# Patient Record
Sex: FEMALE | Employment: FULL TIME | URBAN - METROPOLITAN AREA
[De-identification: names, ages, dates, MRNs, and addresses within clinical notes are randomized per-mention and may not be internally consistent; named-entity substitution may affect disease eponyms.]

---

## 2019-08-13 ENCOUNTER — CLINICAL SUPPORT (OUTPATIENT)
Dept: OTHER | Facility: CLINIC | Age: 49
End: 2019-08-13
Payer: COMMERCIAL

## 2019-08-13 DIAGNOSIS — Z00.8 ENCOUNTER FOR OTHER GENERAL EXAMINATION: ICD-10-CM

## 2019-08-13 PROCEDURE — 99401 PREV MED CNSL INDIV APPRX 15: CPT | Mod: S$GLB,,, | Performed by: INTERNAL MEDICINE

## 2019-08-13 PROCEDURE — 80061 PR  LIPID PANEL: ICD-10-PCS | Mod: QW,S$GLB,, | Performed by: INTERNAL MEDICINE

## 2019-08-13 PROCEDURE — 80061 LIPID PANEL: CPT | Mod: QW,S$GLB,, | Performed by: INTERNAL MEDICINE

## 2019-08-13 PROCEDURE — 99401 PR PREVENT COUNSEL,INDIV,15 MIN: ICD-10-PCS | Mod: S$GLB,,, | Performed by: INTERNAL MEDICINE

## 2019-08-13 PROCEDURE — 82947 PR  ASSAY QUANTITATIVE,BLOOD GLUCOSE: ICD-10-PCS | Mod: QW,S$GLB,, | Performed by: INTERNAL MEDICINE

## 2019-08-13 PROCEDURE — 82947 ASSAY GLUCOSE BLOOD QUANT: CPT | Mod: QW,S$GLB,, | Performed by: INTERNAL MEDICINE

## 2019-08-14 VITALS — HEIGHT: 60 IN

## 2019-08-14 LAB
HDLC SERPL-MCNC: 49 MG/DL
POC CHOLESTEROL, LDL (DOCK): 70 MG/DL
POC CHOLESTEROL, TOTAL: 164 MG/DL
POC GLUCOSE, FASTING: 85 MG/DL (ref 60–110)
TRIGL SERPL-MCNC: 222 MG/DL

## 2022-05-16 ENCOUNTER — HOSPITAL ENCOUNTER (EMERGENCY)
Facility: HOSPITAL | Age: 52
Discharge: ANOTHER HOSPITAL | End: 2022-05-16
Attending: EMERGENCY MEDICINE
Payer: COMMERCIAL

## 2022-05-16 VITALS
BODY MASS INDEX: 24.03 KG/M2 | TEMPERATURE: 99 F | DIASTOLIC BLOOD PRESSURE: 87 MMHG | WEIGHT: 122.38 LBS | SYSTOLIC BLOOD PRESSURE: 151 MMHG | RESPIRATION RATE: 16 BRPM | HEART RATE: 122 BPM | OXYGEN SATURATION: 100 % | HEIGHT: 60 IN

## 2022-05-16 DIAGNOSIS — I16.1 HYPERTENSIVE EMERGENCY: Primary | ICD-10-CM

## 2022-05-16 DIAGNOSIS — I63.9 ACUTE ISCHEMIC STROKE: ICD-10-CM

## 2022-05-16 DIAGNOSIS — R53.1 WEAKNESS: ICD-10-CM

## 2022-05-16 DIAGNOSIS — H54.61 VISION LOSS OF RIGHT EYE: ICD-10-CM

## 2022-05-16 LAB
ALBUMIN SERPL BCP-MCNC: 4.6 G/DL (ref 3.5–5.2)
ALP SERPL-CCNC: 83 U/L (ref 55–135)
ALT SERPL W/O P-5'-P-CCNC: 14 U/L (ref 10–44)
ANION GAP SERPL CALC-SCNC: 16 MMOL/L (ref 8–16)
APTT BLDCRRT: 24.5 SEC (ref 21–32)
AST SERPL-CCNC: 21 U/L (ref 10–40)
BASOPHILS # BLD AUTO: 0.05 K/UL (ref 0–0.2)
BASOPHILS NFR BLD: 0.8 % (ref 0–1.9)
BILIRUB SERPL-MCNC: 0.8 MG/DL (ref 0.1–1)
BUN SERPL-MCNC: 17 MG/DL (ref 6–20)
CALCIUM SERPL-MCNC: 10.1 MG/DL (ref 8.7–10.5)
CHLORIDE SERPL-SCNC: 104 MMOL/L (ref 95–110)
CO2 SERPL-SCNC: 23 MMOL/L (ref 23–29)
CREAT SERPL-MCNC: 1.3 MG/DL (ref 0.5–1.4)
DIFFERENTIAL METHOD: NORMAL
EOSINOPHIL # BLD AUTO: 0.1 K/UL (ref 0–0.5)
EOSINOPHIL NFR BLD: 1.3 % (ref 0–8)
ERYTHROCYTE [DISTWIDTH] IN BLOOD BY AUTOMATED COUNT: 12.9 % (ref 11.5–14.5)
EST. GFR  (AFRICAN AMERICAN): 56 ML/MIN/1.73 M^2
EST. GFR  (NON AFRICAN AMERICAN): 48 ML/MIN/1.73 M^2
GLUCOSE SERPL-MCNC: 108 MG/DL (ref 70–110)
HCT VFR BLD AUTO: 37.6 % (ref 37–48.5)
HGB BLD-MCNC: 12.6 G/DL (ref 12–16)
IMM GRANULOCYTES # BLD AUTO: 0.02 K/UL (ref 0–0.04)
IMM GRANULOCYTES NFR BLD AUTO: 0.3 % (ref 0–0.5)
INR PPP: 1 (ref 0.8–1.2)
LYMPHOCYTES # BLD AUTO: 2.5 K/UL (ref 1–4.8)
LYMPHOCYTES NFR BLD: 40.5 % (ref 18–48)
MCH RBC QN AUTO: 30.1 PG (ref 27–31)
MCHC RBC AUTO-ENTMCNC: 33.5 G/DL (ref 32–36)
MCV RBC AUTO: 90 FL (ref 82–98)
MONOCYTES # BLD AUTO: 0.4 K/UL (ref 0.3–1)
MONOCYTES NFR BLD: 6.2 % (ref 4–15)
NEUTROPHILS # BLD AUTO: 3.1 K/UL (ref 1.8–7.7)
NEUTROPHILS NFR BLD: 50.9 % (ref 38–73)
NRBC BLD-RTO: 0 /100 WBC
PLATELET # BLD AUTO: 298 K/UL (ref 150–450)
PMV BLD AUTO: 9.8 FL (ref 9.2–12.9)
POCT GLUCOSE: 93 MG/DL (ref 70–110)
POTASSIUM SERPL-SCNC: 4.4 MMOL/L (ref 3.5–5.1)
PROT SERPL-MCNC: 8 G/DL (ref 6–8.4)
PROTHROMBIN TIME: 10.4 SEC (ref 9–12.5)
RBC # BLD AUTO: 4.18 M/UL (ref 4–5.4)
SODIUM SERPL-SCNC: 143 MMOL/L (ref 136–145)
TROPONIN I SERPL DL<=0.01 NG/ML-MCNC: <0.006 NG/ML (ref 0–0.03)
WBC # BLD AUTO: 6.13 K/UL (ref 3.9–12.7)

## 2022-05-16 PROCEDURE — G0508 CRIT CARE TELEHEA CONSULT 60: HCPCS | Mod: GT,,, | Performed by: PSYCHIATRY & NEUROLOGY

## 2022-05-16 PROCEDURE — 99291 CRITICAL CARE FIRST HOUR: CPT | Mod: 25

## 2022-05-16 PROCEDURE — 63600175 PHARM REV CODE 636 W HCPCS: Performed by: EMERGENCY MEDICINE

## 2022-05-16 PROCEDURE — 93010 EKG 12-LEAD: ICD-10-PCS | Mod: ,,, | Performed by: INTERNAL MEDICINE

## 2022-05-16 PROCEDURE — G0508 PR CRITICAL CARE TELEHLTH INITIAL CONSULT 60MIN: ICD-10-PCS | Mod: GT,,, | Performed by: PSYCHIATRY & NEUROLOGY

## 2022-05-16 PROCEDURE — 85610 PROTHROMBIN TIME: CPT | Performed by: NURSE PRACTITIONER

## 2022-05-16 PROCEDURE — 85730 THROMBOPLASTIN TIME PARTIAL: CPT | Performed by: NURSE PRACTITIONER

## 2022-05-16 PROCEDURE — 82962 GLUCOSE BLOOD TEST: CPT

## 2022-05-16 PROCEDURE — 85025 COMPLETE CBC W/AUTO DIFF WBC: CPT | Performed by: NURSE PRACTITIONER

## 2022-05-16 PROCEDURE — 84484 ASSAY OF TROPONIN QUANT: CPT | Performed by: NURSE PRACTITIONER

## 2022-05-16 PROCEDURE — 93010 ELECTROCARDIOGRAM REPORT: CPT | Mod: ,,, | Performed by: INTERNAL MEDICINE

## 2022-05-16 PROCEDURE — 96375 TX/PRO/DX INJ NEW DRUG ADDON: CPT

## 2022-05-16 PROCEDURE — 93005 ELECTROCARDIOGRAM TRACING: CPT

## 2022-05-16 PROCEDURE — 80053 COMPREHEN METABOLIC PANEL: CPT | Performed by: NURSE PRACTITIONER

## 2022-05-16 PROCEDURE — 96374 THER/PROPH/DIAG INJ IV PUSH: CPT

## 2022-05-16 RX ORDER — DILTIAZEM HYDROCHLORIDE 5 MG/ML
INJECTION INTRAVENOUS
Status: DISCONTINUED
Start: 2022-05-16 | End: 2022-05-16 | Stop reason: WASHOUT

## 2022-05-16 RX ORDER — LORAZEPAM 2 MG/ML
1 INJECTION INTRAMUSCULAR
Status: DISCONTINUED | OUTPATIENT
Start: 2022-05-16 | End: 2022-05-16 | Stop reason: HOSPADM

## 2022-05-16 RX ORDER — HYDRALAZINE HYDROCHLORIDE 20 MG/ML
20 INJECTION INTRAMUSCULAR; INTRAVENOUS
Status: DISCONTINUED | OUTPATIENT
Start: 2022-05-16 | End: 2022-05-16

## 2022-05-16 RX ORDER — SODIUM CHLORIDE 9 MG/ML
INJECTION, SOLUTION INTRAVENOUS
Status: DISCONTINUED | OUTPATIENT
Start: 2022-05-16 | End: 2022-05-16 | Stop reason: HOSPADM

## 2022-05-16 RX ORDER — HYDRALAZINE HYDROCHLORIDE 20 MG/ML
10 INJECTION INTRAMUSCULAR; INTRAVENOUS
Status: COMPLETED | OUTPATIENT
Start: 2022-05-16 | End: 2022-05-16

## 2022-05-16 RX ADMIN — ALTEPLASE 45 MG: KIT at 04:05

## 2022-05-16 RX ADMIN — HYDRALAZINE HYDROCHLORIDE 10 MG: 20 INJECTION, SOLUTION INTRAMUSCULAR; INTRAVENOUS at 04:05

## 2022-05-16 NOTE — ASSESSMENT & PLAN NOTE
R superior quadrantanopsia, subjective diplopia on horizontal leftward gaze, significant gait imbalance.  No contraindications to alteplase at this time.    Antithrombotics for secondary stroke prevention: Antiplatelets: None: Hold all Antithrombotics x 24 hours after IV t-PA administration    Statins for secondary stroke prevention and hyperlipidemia, if present:   Statins: per profile    Aggressive risk factor modification: None     Rehab efforts: The patient has been evaluated by a stroke team provider and the therapy needs have been fully considered based off the presenting complaints and exam findings. The following therapy evaluations are needed: PT evaluate and treat, OT evaluate and treat, SLP evaluate and treat    Diagnostics ordered/pending: CTA Head to assess vasculature , CTA Neck/Arch to assess vasculature, HgbA1C to assess blood glucose levels, Lipid Profile to assess cholesterol levels, MRI head without contrast to assess brain parenchyma, TTE to assess cardiac function/status     VTE prophylaxis: None: Reason for No Pharmacological VTE Prophylaxis: Holding x 24 hours s/p treatment with alteplase (tPA)    BP parameters: Infarct: Post tPA, SBP <180

## 2022-05-16 NOTE — FIRST PROVIDER EVALUATION
Medical screening exam completed.  I have conducted a focused provider triage encounter, findings are as follows:    Brief history of present illness:  Patient presents to ER for visual changes to left eye, onset today. Patient reports leaning to right side when walking.    Vitals:    05/16/22 1513   BP: (!) 196/108   BP Location: Right arm   Patient Position: Sitting   Pulse: 104   Resp: 18   Temp: 98.9 °F (37.2 °C)   TempSrc: Oral   SpO2: 100%   Weight: 49.9 kg (110 lb)       Pertinent physical exam:  Patient AAOx3, remained in wheelchair throughout triage. Speech clear.    Brief workup plan:  imaging, labs, EKG    Preliminary workup initiated; this workup will be continued and followed by the physician or advanced practice provider that is assigned to the patient when roomed.

## 2022-05-16 NOTE — CONSULTS
[facility]  Vascular Neurology  Comprehensive Stroke Center  TeleVascular Neurology Acute Consultation Note      Consults    Consulting Provider: ANTONIO NIETO JR  Current Providers  No providers found    Patient Location:  [facility] EMERGENCY DEPARTMENT Emergency Department  Spoke hospital nurse at bedside with patient assisting consultant.     Patient information was obtained from patient and relative(s).         Assessment/Plan:       Diagnoses:   Acute ischemic stroke  R superior quadrantanopsia, subjective diplopia on horizontal leftward gaze, significant gait imbalance.  No contraindications to alteplase at this time.    Antithrombotics for secondary stroke prevention: Antiplatelets: None: Hold all Antithrombotics x 24 hours after IV t-PA administration    Statins for secondary stroke prevention and hyperlipidemia, if present:   Statins: per profile    Aggressive risk factor modification: None     Rehab efforts: The patient has been evaluated by a stroke team provider and the therapy needs have been fully considered based off the presenting complaints and exam findings. The following therapy evaluations are needed: PT evaluate and treat, OT evaluate and treat, SLP evaluate and treat    Diagnostics ordered/pending: CTA Head to assess vasculature , CTA Neck/Arch to assess vasculature, HgbA1C to assess blood glucose levels, Lipid Profile to assess cholesterol levels, MRI head without contrast to assess brain parenchyma, TTE to assess cardiac function/status     VTE prophylaxis: None: Reason for No Pharmacological VTE Prophylaxis: Holding x 24 hours s/p treatment with alteplase (tPA)    BP parameters: Infarct: Post tPA, SBP <180            STROKE DOCUMENTATION     Acute Stroke Times:   Acute Stroke Times   Last Known Normal Time: 1400  Stroke Team Called Time: 1535  Stroke Team Arrival Time: 1537  CT Interpretation Time: 1537  Alteplase Recommended: Yes  Decision to Treat Time  for Alteplase: 1554    NIH Scale:  1a. Level of Consciousness: 0-->Alert, keenly responsive  1b. LOC Questions: 0-->Answers both questions correctly  1c. LOC Commands: 0-->Performs both tasks correctly  2. Best Gaze: 0-->Normal (subjective diplopia on leftward gaze)  3. Visual: 1-->Partial hemianopia (R upper quad)  4. Facial Palsy: 0-->Normal symmetrical movements  5a. Motor Arm, Left: 0-->No drift, limb holds 90 (or 45) degrees for full 10 secs  5b. Motor Arm, Right: 0-->No drift, limb holds 90 (or 45) degrees for full 10 secs  6a. Motor Leg, Left: 0-->No drift, leg holds 30 degree position for full 5 secs  6b. Motor Leg, Right: 0-->No drift, leg holds 30 degree position for full 5 secs  7. Limb Ataxia: 0-->Absent  8. Sensory: 0-->Normal, no sensory loss  9. Best Language: 0-->No aphasia, normal  10. Dysarthria: 0-->Normal  11. Extinction and Inattention (formerly Neglect): 0-->No abnormality  Total (NIH Stroke Scale): 1     Modified Cook    Magdiel Coma Scale:    ABCD2 Score:    [id number] Score:   HAS -BLED Score:   ICH Score:   Hunt & Carias Classification:       Blood pressure (!) 196/108, pulse 104, temperature 98.9 °F (37.2 °C), temperature source Oral, resp. rate 18, weight 49.9 kg (110 lb), SpO2 100 %.  Alteplase Eligible?: Yes  Alteplase Recommendation:   Alteplase Total Dose:   Total dose: Alteplase 0.9mg/kg (max dose:90mg)                      ** based on acquired weight from facility   Bolus Dose:   10% of total Alteplase dose given intravenously over 1 minute   Continuous Infusion Dose:   Remaining 90% of total Alteplase dose infused intravenously over 60 minutes    **infusion must start at the same time as the bolus dose     Additional Recommendations:   1. Neurological assessment and vital signs (except temperature) every 15 minutes during Altaplase infusion.  2. Frequency of BP assessments may need to be increased if systolic BP stays >= 180 mm Hg or diastolic BP stays >= 105 mm Hg. Administer  antihypertensive meds as ordered  3. Continue to monitor and control blood pressure and monitor for neurological deterioration every 15 minutes for the first hour after the infusion is stopped. Then every 30 minutes for the next 6 hours. Perform hourly monitoring from the 8th post-infusion hour until 24 hours post-infusion.  4. Temperature every 4 hours or as required.  5. Follow hospital protocol for further orders re: post tPA infusion patient management.  6. No antithrombotics or anticoagulants (including but not limited to: heparin, warfarin, aspirin, clopidigrel, or dipyridamole) for 24 hours, then start antithrombotics as ordered by treating physician    Adapted from the American Heart Association/American Stroke Association (AHA/ASA) and American Association of Neuroscience Nurses (AANN) Guidelines.   Possible Interventional Revascularization Candidate? Yes    Disposition Recommendation: transfer to [facility] by  air  stat    Subjective:     History of Present Illness:  51F w/ vision difficulty and imbalance. Currently starting to see better but not back to baseline.Hx of anxiety and depression. Episode today w/ daughter who is struggling and was made very upset today by her daughter.        Woke up with symptoms?: no    Recent bleeding noted: no  Does the patient take any Blood Thinners? no  Medications: No relevant medications      Past Medical History: depression, anxiety    Past Surgical History: no major surgeries within the last 2 weeks    Family History: no relevant history    Social History: no smoking, no drinking, no drugs    Allergies: No Known Allergies No relevant allergies    Review of Systems   Constitutional: Negative for appetite change.   HENT: Negative for congestion.    Eyes: Negative for discharge.   Respiratory: Negative for shortness of breath.    Cardiovascular: Negative for chest pain.   Gastrointestinal: Negative for abdominal pain.   Endocrine: Negative for cold  intolerance.   Genitourinary: Negative for difficulty urinating.   Musculoskeletal: Negative for joint swelling.   Skin: Negative for color change.     Objective:   Vitals: Blood pressure (!) 196/108, pulse 104, temperature 98.9 °F (37.2 °C), temperature source Oral, resp. rate 18, weight 49.9 kg (110 lb), SpO2 100 %.     CT READ: Yes  No hemmorhage. No mass effect. No early infarct signs.     Physical Exam  Constitutional:       General: She is not in acute distress.  HENT:      Head: Normocephalic.      Right Ear: External ear normal.      Left Ear: External ear normal.   Eyes:      Conjunctiva/sclera: Conjunctivae normal.   Pulmonary:      Effort: Pulmonary effort is normal.      Breath sounds: No stridor.   Abdominal:      Tenderness: There is no abdominal tenderness.   Musculoskeletal:      Cervical back: Normal range of motion.   Skin:     General: Skin is dry.      Findings: No rash.               Recommended the emergency room physician to have a brief discussion with the patient and/or family if available regarding the  risks and benefits of treatment, and to briefly document the occurrence of that discussion in his clinical encounter note.     The attending portion of this evaluation, treatment, and documentation was performed per Rangel Gaston MD via audiovisual.    In your opinion, this was a: Tier 1 Van Negative    Consult End Time: 3:58 PM     Rangel Gaston MD  [facility]  Vascular Neurology   [facility]

## 2022-05-16 NOTE — HPI
51F w/ vision difficulty and imbalance. Currently starting to see better but not back to baseline.Hx of anxiety and depression. Episode today w/ daughter who is struggling and was made very upset today by her daughter.

## 2022-05-16 NOTE — SUBJECTIVE & OBJECTIVE
Woke up with symptoms?: no    Recent bleeding noted: no  Does the patient take any Blood Thinners? no  Medications: No relevant medications      Past Medical History: depression, anxiety    Past Surgical History: no major surgeries within the last 2 weeks    Family History: no relevant history    Social History: no smoking, no drinking, no drugs    Allergies: No Known Allergies No relevant allergies    Review of Systems   Constitutional: Negative for appetite change.   HENT: Negative for congestion.    Eyes: Negative for discharge.   Respiratory: Negative for shortness of breath.    Cardiovascular: Negative for chest pain.   Gastrointestinal: Negative for abdominal pain.   Endocrine: Negative for cold intolerance.   Genitourinary: Negative for difficulty urinating.   Musculoskeletal: Negative for joint swelling.   Skin: Negative for color change.     Objective:   Vitals: Blood pressure (!) 196/108, pulse 104, temperature 98.9 °F (37.2 °C), temperature source Oral, resp. rate 18, weight 49.9 kg (110 lb), SpO2 100 %.     CT READ: Yes  No hemmorhage. No mass effect. No early infarct signs.     Physical Exam  Constitutional:       General: She is not in acute distress.  HENT:      Head: Normocephalic.      Right Ear: External ear normal.      Left Ear: External ear normal.   Eyes:      Conjunctiva/sclera: Conjunctivae normal.   Pulmonary:      Effort: Pulmonary effort is normal.      Breath sounds: No stridor.   Abdominal:      Tenderness: There is no abdominal tenderness.   Musculoskeletal:      Cervical back: Normal range of motion.   Skin:     General: Skin is dry.      Findings: No rash.

## 2022-05-16 NOTE — ED PROVIDER NOTES
SCRIBE #1 NOTE: I, Jenaro Lobato, am scribing for, and in the presence of, Rosendo Mane Jr., MD. I have scribed the HPI, ROS, and PEx.     SCRIBE #2 NOTE: I, uHgo Haines, am scribing for, and in the presence of,  Hilton Saini Do, MD. I have scribed the remaining portions of the note not scribed by Scribe #1.     History      Chief Complaint   Patient presents with    Loss of Vision     Loss of vision suddenly in left eye 1 hour ago. Feels like she cant walk because shes leaning to the right        Review of patient's allergies indicates:  No Known Allergies     HPI   HPI    5/16/2022, 3:29 PM   History obtained from the patient      History of Present Illness: Sloane Perkins is a 51 y.o. female patient who presents to the Emergency Department for L-eye vision loss, onset suddenly just PTA. Last known well time 1 hour PTA. Symptoms are constant and moderate in severity. No mitigating or exacerbating factors reported. Associated sxs include dizziness, LLE weakness, L-sided tinnitus, headache, and R-leaning gait. Patient denies any fever, chills, n/v/d, SOB, CP, numbness, headache, and all other sxs at this time. No prior Tx reported. No further complaints or concerns at this time.     Arrival mode: Personal vehicle    PCP: Primary Doctor No       Past Medical History:  No past medical history on file.    Past Surgical History:  No past surgical history on file.      Family History:  No family history on file.    Social History:  Social History     Tobacco Use    Smoking status: Not on file    Smokeless tobacco: Not on file   Substance and Sexual Activity    Alcohol use: Not on file    Drug use: Not on file    Sexual activity: Not on file       ROS   Review of Systems   Constitutional: Negative for chills and fever.   HENT: Positive for tinnitus (L). Negative for sore throat.    Eyes: Positive for visual disturbance (L eye vision loss).   Respiratory: Negative for shortness of breath.    Cardiovascular:  Negative for chest pain.   Gastrointestinal: Negative for diarrhea, nausea and vomiting.   Genitourinary: Negative for dysuria.   Musculoskeletal: Positive for gait problem (R-leaning). Negative for back pain.   Skin: Negative for rash.   Neurological: Positive for dizziness, weakness (LLE) and headaches. Negative for numbness.   Hematological: Does not bruise/bleed easily.   All other systems reviewed and are negative.    Physical Exam      Initial Vitals [05/16/22 1513]   BP Pulse Resp Temp SpO2   (!) 196/108 104 18 98.9 °F (37.2 °C) 100 %      MAP       --          Physical Exam  Nursing Notes and Vital Signs Reviewed.  Constitutional: Patient is in no acute distress. Well-developed and well-nourished.  Head: Atraumatic. Normocephalic.  Eyes:  EOM intact.  No scleral icterus.  ENT: Mucous membranes are moist.  Nares clear   Neck:  Full ROM. No JVD.  Cardiovascular: Regular rate. Regular rhythm No murmurs, rubs, or gallops. Distal pulses are 2+ and symmetric  Pulmonary/Chest: No respiratory distress. Clear to auscultation bilaterally. No wheezing or rales.  Equal chest wall rise bilaterally  Abdominal: Soft and non-distended.  There is no tenderness.  No rebound, guarding, or rigidity. Good bowel sounds.  Genitourinary: No CVA tenderness.  No suprapubic tenderness  Musculoskeletal: Moves all extremities. No obvious deformities.  5 x 5 strength in all extremities   Skin: Warm and dry.  Neurological:  Alert, awake, and appropriate.  Normal speech. Two through 12 intact bilaterally.  Psychiatric: Normal affect. Good eye contact. Appropriate in content.    ED Course    Critical Care    Date/Time: 5/16/2022 3:34 PM  Performed by: Rosendo Mane Jr., MD  Authorized by: Rosendo Mane Jr., MD   Direct patient critical care time: 25 minutes  Additional history critical care time: 5 minutes  Ordering / reviewing critical care time: 15 minutes  Documentation critical care time: 10 minutes  Consulting other physicians  critical care time: 5 minutes  Total critical care time (exclusive of procedural time) : 60 minutes  Critical care time was exclusive of separately billable procedures and treating other patients and teaching time.  Critical care was necessary to treat or prevent imminent or life-threatening deterioration of the following conditions: Stroke s/p tPA.  Critical care was time spent personally by me on the following activities: blood draw for specimens, development of treatment plan with patient or surrogate, discussions with consultants, interpretation of cardiac output measurements, evaluation of patient's response to treatment, examination of patient, obtaining history from patient or surrogate, ordering and performing treatments and interventions, ordering and review of laboratory studies, ordering and review of radiographic studies, pulse oximetry, re-evaluation of patient's condition and review of old charts.        ED Vital Signs:  Vitals:    05/16/22 1513 05/16/22 1600 05/16/22 1607 05/16/22 1630   BP: (!) 196/108   (!) 169/99   Pulse: 104 (!) 112  (!) 122   Resp: 18   (!) 35   Temp: 98.9 °F (37.2 °C)      TempSrc: Oral      SpO2: 100% 100%  100%   Weight: 49.9 kg (110 lb)  55.5 kg (122 lb 5.7 oz)    Height:   5' (1.524 m)     05/16/22 1635 05/16/22 1640 05/16/22 1645 05/16/22 1650   BP:  (!) 157/93 (!) 157/93 (!) 151/87   Pulse: (!) 124 (!) 126 (!) 123 (!) 122   Resp: 20 20 20 20   Temp:       TempSrc:       SpO2: 100% 100% 100% 100%   Weight:       Height:        05/16/22 1700 05/16/22 1705   BP: (!) 147/83 (!) 151/87   Pulse: (!) 122 (!) 122   Resp: (!) 37 16   Temp:  98.8 °F (37.1 °C)   TempSrc:     SpO2: 100%    Weight:     Height:         Abnormal Lab Results:  Labs Reviewed   COMPREHENSIVE METABOLIC PANEL - Abnormal; Notable for the following components:       Result Value    eGFR if  56 (*)     eGFR if non  48 (*)     All other components within normal limits   CBC W/ AUTO  DIFFERENTIAL   PROTIME-INR   TROPONIN I   APTT   POCT GLUCOSE   POCT GLUCOSE MONITORING CONTINUOUS        All Lab Results:      Imaging Results:  Imaging Results          CT Head Without Contrast (Final result)  Result time 05/16/22 15:30:25    Final result by Jaquan Lowery MD (05/16/22 15:30:25)                 Impression:      No acute abnormality.    All CT scans at this facility use dose modulation, iterative reconstruction, and/or weight based dosing when appropriate to reduce radiation dose to as low as reasonable achievable.      Electronically signed by: Jaquan Lowery MD  Date:    05/16/2022  Time:    15:30             Narrative:    EXAMINATION:  CT HEAD WITHOUT CONTRAST    CLINICAL HISTORY:  loss of vision, weakness;    TECHNIQUE:  Low dose axial CT images obtained throughout the head without intravenous contrast. Sagittal and coronal reconstructions were performed.    All CT scans at this facility use dose modulation, iterative reconstruction, and/or weight based dosing when appropriate to reduce radiation dose to as low as reasonable achievable.    COMPARISON:  None.    FINDINGS:  Intracranial compartment:    The brain parenchyma appears normal. No parenchymal mass, hemorrhage, edema or major vascular distribution infarct.    Ventricles and sulci are normal in size for age without evidence of hydrocephalus.    No extra-axial blood or fluid collections.    Skull/extracranial contents (limited evaluation): No fracture.  Status post Fess surgery.  Mastoid air cells and paranasal sinuses are essentially clear.                               The EKG was ordered, reviewed, and independently interpreted by the ED provider.  Interpretation time: 15:37  Rate: 100 BPM  Rhythm: normal sinus rhythm  Interpretation: Possible left atrial enlargement. No STEMI.           The Emergency Provider reviewed the vital signs and test results, which are outlined above.    ED Discussion     3:54 PM: Discussed pt's case with  Dr. Gaston (Vascular Neurology via Tele Stroke consult), who evaluated pt at bedside and recommends tPA, CTA head, and transfer to [facility] for Neurosurgery services.    4:00 PM: Dr. Mane transfers care of patient to Dr. Arambula pending lab results.    5:17 PM  Patient got tPA around 4:44 a.m. may be a few minutes earlier.  Her symptoms are the same at this time.  She will be transferred to [facility] to the neuro critical care floor where they would do a CTA head and neck.  [facility]   I talked to her and her children extensively about the case     STROKE DOCUMENTATION      Acute Stroke Times:   Acute Stroke Times   Last Known Normal Time: 1400  Stroke Team Called Time: 1535  Stroke Team Arrival Time: 1537  CT Interpretation Time: 1537  Alteplase Recommended: Yes  Decision to Treat Time for Alteplase: 1554     NIH Scale:  1a. Level of Consciousness: 0-->Alert, keenly responsive  1b. LOC Questions: 0-->Answers both questions correctly  1c. LOC Commands: 0-->Performs both tasks correctly  2. Best Gaze: 0-->Normal (subjective diplopia on leftward gaze)  3. Visual: 1-->Partial hemianopia (R upper quad)  4. Facial Palsy: 0-->Normal symmetrical movements  5a. Motor Arm, Left: 0-->No drift, limb holds 90 (or 45) degrees for full 10 secs  5b. Motor Arm, Right: 0-->No drift, limb holds 90 (or 45) degrees for full 10 secs  6a. Motor Leg, Left: 0-->No drift, leg holds 30 degree position for full 5 secs  6b. Motor Leg, Right: 0-->No drift, leg holds 30 degree position for full 5 secs  7. Limb Ataxia: 0-->Absent  8. Sensory: 0-->Normal, no sensory loss  9. Best Language: 0-->No aphasia, normal  10. Dysarthria: 0-->Normal  11. Extinction and Inattention (formerly Neglect): 0-->No abnormality  Total (NIH Stroke Scale): 1      Modified [place]    [place] Coma Scale:    ABCD2 Score:    [id number] Score:   HAS -BLED Score:   ICH Score:   Hunt & Carias Classification:         Blood  pressure (!) 196/108, pulse 104, temperature 98.9 °F (37.2 °C), temperature source Oral, resp. rate 18, weight 49.9 kg (110 lb), SpO2 100 %.  Alteplase Eligible?: Yes  Alteplase Recommendation:   Alteplase Total Dose:   Total dose: Alteplase 0.9mg/kg (max dose:90mg)                      ** based on acquired weight from facility   Bolus Dose:   10% of total Alteplase dose given intravenously over 1 minute   Continuous Infusion Dose:   Remaining 90% of total Alteplase dose infused intravenously over 60 minutes    **infusion must start at the same time as the bolus dose      Additional Recommendations:   1. Neurological assessment and vital signs (except temperature) every 15 minutes during Altaplase infusion.  2. Frequency of BP assessments may need to be increased if systolic BP stays >= 180 mm Hg or diastolic BP stays >= 105 mm Hg. Administer antihypertensive meds as ordered  3. Continue to monitor and control blood pressure and monitor for neurological deterioration every 15 minutes for the first hour after the infusion is stopped. Then every 30 minutes for the next 6 hours. Perform hourly monitoring from the 8th post-infusion hour until 24 hours post-infusion.  4. Temperature every 4 hours or as required.  5. Follow hospital protocol for further orders re: post tPA infusion patient management.  6. No antithrombotics or anticoagulants (including but not limited to: heparin, warfarin, aspirin, clopidigrel, or dipyridamole) for 24 hours, then start antithrombotics as ordered by treating physician    Adapted from the American Heart Association/American Stroke Association (AHA/ASA) and American Association of Neuroscience Nurses (AANN) Guidelines.   Possible Interventional Revascularization Candidate? Yes           ED Medication(s):  Medications   lorazepam injection 1 mg (1 mg Intravenous Not Given 5/16/22 1625)   alteplase (ACtivase) injection 45 mg (45 mg Intravenous New Bag 5/16/22 1630)   0.9%  NaCl infusion (has  no administration in time range)   hydrALAZINE injection 10 mg (10 mg Intravenous Given 5/16/22 1605)   ALTEPLASE IV BOLUS FROM VIAL 5 mg (5 mg Intravenous Bolus from Bag 5/16/22 1627)          New Prescriptions    No medications on file         Medical Decision Making    Medical Decision Making:   Clinical Tests:   Lab Tests: Ordered and Reviewed  Radiological Study: Ordered and Reviewed  Medical Tests: Ordered and Reviewed           Scribe Attestation:   Scribe #1: I performed the above scribed service and the documentation accurately describes the services I performed. I attest to the accuracy of the note.    Attending:   Physician Attestation Statement for Scribe #1: I, Rosendo Mane Jr., MD, personally performed the services described in this documentation, as scribed by Jenaro Lobato, in my presence, and it is both accurate and complete.       Scribe Attestation:   Scribe #2: I performed the above scribed service and the documentation accurately describes the services I performed. I attest to the accuracy of the note.    Attending Attestation:           Physician Attestation for Scribe:    Physician Attestation Statement for Scribe #2: I, Hilton Saini Do, MD, reviewed documentation, as scribed by Hugo Haines in my presence, and it is both accurate and complete. I also acknowledge and confirm the content of the note done by Scribe #1.          Clinical Impression       ICD-10-CM ICD-9-CM   1. Hypertensive emergency  I16.1 401.9   2. Weakness  R53.1 780.79   3. Acute ischemic stroke  I63.9 434.91   4. Vision loss of right eye  H54.61 369.8       Disposition:   Disposition: Transferred  Condition: Stable         Hilton Saini Do, MD  05/16/22 7140